# Patient Record
(demographics unavailable — no encounter records)

---

## 2025-07-01 NOTE — PROCEDURE
[de-identified] : Stroboscopic Laryngoscopy Procedure Note:  Indication:	Assess laryngeal biomechanics and vocal fold oscillation.  Description of Procedure:	Informed consent was verbally obtained from the patient prior to the procedure. The patient was seated in the clinic chair. Topical anesthesia was achieved by first spraying the nasal cavities with 4% lidocaine and nasal decongestant.   Findings:  Supraglottis: no masses or lesions  Glottis:    Structure:                        Right: crisp and shows no lesions or masses                        Left:  moderate to large mid fold cyst                Mobility:                        Right:  normal                        Left:  normal                Amplitude:                        Right:  normal                       Left:  normal                Closure: anterior glottic gap                Wave symmetry:  asymmetric  Subglottis: no masses or lesions within the visualized subglottis Visualized airway is widely patent. Other: severe lateral supraglottic squeeze on phonation

## 2025-07-01 NOTE — HISTORY OF PRESENT ILLNESS
[de-identified] : KEVIN CARRANZA is a 74 year old woman who presents to the [facility] with difficulty phonating. She is referred by Dr. Christopher Oliver. Last seen approx 2 weeks ago.  This problem has been going on for ~1 year. They describe their voice as hoarse and feels like something is bothering her throat when speaking She now does not have periods of normal voicing. She does not have difficulties with swallowing. She does have frequent classic heartburn symptoms. Omeprazole PRN. Smoking history: none Denies dyspnea, dysphagia, fevers/chills, and prior throat surgeries. VOCAL DEMANDS: Her vocal demands are primarily those of general conversation No prior voice therapy.

## 2025-07-01 NOTE — PROCEDURE
[de-identified] : Stroboscopic Laryngoscopy Procedure Note:  Indication:	Assess laryngeal biomechanics and vocal fold oscillation.  Description of Procedure:	Informed consent was verbally obtained from the patient prior to the procedure. The patient was seated in the clinic chair. Topical anesthesia was achieved by first spraying the nasal cavities with 4% lidocaine and nasal decongestant.   Findings:  Supraglottis: no masses or lesions  Glottis:    Structure:                        Right: crisp and shows no lesions or masses                        Left:  moderate to large mid fold cyst                Mobility:                        Right:  normal                        Left:  normal                Amplitude:                        Right:  normal                       Left:  normal                Closure: anterior glottic gap                Wave symmetry:  asymmetric  Subglottis: no masses or lesions within the visualized subglottis Visualized airway is widely patent. Other: severe lateral supraglottic squeeze on phonation

## 2025-07-01 NOTE — CONSULT LETTER
[Dear  ___] : Dear  [unfilled], [Consult Letter:] : I had the pleasure of evaluating your patient, [unfilled]. [Please see my note below.] : Please see my note below. [Consult Closing:] : Thank you very much for allowing me to participate in the care of this patient.  If you have any questions, please do not hesitate to contact me. [Sincerely,] : Sincerely, [FreeTextEntry2] : Dr. Christopher Oliver [FreeTextEntry3] : Brigido Kirkland M.D. Division of Laryngology | Department of Otolaryngology  [address] [address]

## 2025-07-01 NOTE — HISTORY OF PRESENT ILLNESS
[de-identified] : KEVIN CARRANZA is a 74 year old woman who presents to the [facility] with difficulty phonating. She is referred by Dr. Christopher Oliver. Last seen approx 2 weeks ago.  This problem has been going on for ~1 year. They describe their voice as hoarse and feels like something is bothering her throat when speaking She now does not have periods of normal voicing. She does not have difficulties with swallowing. She does have frequent classic heartburn symptoms. Omeprazole PRN. Smoking history: none Denies dyspnea, dysphagia, fevers/chills, and prior throat surgeries. VOCAL DEMANDS: Her vocal demands are primarily those of general conversation No prior voice therapy.

## 2025-07-01 NOTE — ASSESSMENT
[FreeTextEntry1] :  Assessment/Plan: #1 Dysphonia #2 Left vocal fold cyst #3 DMII   1) Observation 2) Voice therapy alone 3) Voice therapy plus surgery.  Surgery would involve direct laryngoscopy with excision of vocal fold lesion(s) with microflap, possible laryngeal biopsy, injection of steroid in right and/or left vocal fold(s), and examination of their trachea and mainstem bronchi.  The risks include but are not limited to damage to the teeth, vocal fold scarring, and a worse voice.  They would see a speech language pathologist prior to surgery and at least once after surgery at approximately the 1 month post-op faiza.  They would see me post operatively at 1 week and 1 month.  They would be expected to have strict voice rest 5 days after surgery and moderate voice use for the 2 weeks following that.    The risks, benefits, and alternatives were discussed with the patient and understanding expressed.   The patient elected to proceed with option #3.  Does not speak English.

## 2025-07-01 NOTE — CONSULT LETTER
[Dear  ___] : Dear  [unfilled], [Consult Letter:] : I had the pleasure of evaluating your patient, [unfilled]. [Please see my note below.] : Please see my note below. [Consult Closing:] : Thank you very much for allowing me to participate in the care of this patient.  If you have any questions, please do not hesitate to contact me. [Sincerely,] : Sincerely, [FreeTextEntry2] : Dr. Christopher Oliver [FreeTextEntry3] : Brigido Kirkland M.D. Division of Laryngology | Department of Otolaryngology  [address] [address]